# Patient Record
Sex: FEMALE | URBAN - NONMETROPOLITAN AREA
[De-identification: names, ages, dates, MRNs, and addresses within clinical notes are randomized per-mention and may not be internally consistent; named-entity substitution may affect disease eponyms.]

---

## 2021-06-28 RX ORDER — CELECOXIB 200 MG/1
200 CAPSULE ORAL 2 TIMES DAILY
COMMUNITY

## 2021-06-28 RX ORDER — CYCLOBENZAPRINE HCL 5 MG
5 TABLET ORAL 3 TIMES DAILY PRN
COMMUNITY

## 2021-06-28 RX ORDER — METHYLPREDNISOLONE 4 MG/1
2 TABLET ORAL DAILY
COMMUNITY

## 2021-07-13 ENCOUNTER — TELEPHONE (OUTPATIENT)
Dept: VASCULAR SURGERY | Age: 66
End: 2021-07-13

## 2021-07-13 NOTE — TELEPHONE ENCOUNTER
Contacted patient to reschedule appointment on 7/27/21. Patient stated that she will not be able to keep any appointments at this time due to losing her job and no transportation to and from [place].

## 2024-10-31 ENCOUNTER — TELEPHONE (OUTPATIENT)
Dept: NEUROSURGERY | Age: 69
End: 2024-10-31

## 2024-10-31 NOTE — TELEPHONE ENCOUNTER
1st attempt to contact patient. I left a voicemail instructing patient to call back at [phone] to schedule their new patient appointment     Spinal stenosis, lumbosacral region  M54.42 (ICD-10-CM) - Lumbago with sciatica, left side  M54.41 (ICD-10-CM) - Lumbago with sciatica, right side  M48.00 (ICD-10-CM) - Spinal stenosis, site unspecified

## 2024-12-17 ENCOUNTER — TELEPHONE (OUTPATIENT)
Dept: NEUROSURGERY | Age: 69
End: 2024-12-17

## 2024-12-17 NOTE — TELEPHONE ENCOUNTER
Maribel with referral provider office called she ask for a called back to scheduled patient new patient appointment. Please called Maribel [phone]      Thank you

## 2024-12-17 NOTE — TELEPHONE ENCOUNTER
Returned call to Maribel and let her know I did speak with the patient this morning and let her know the conversation we had about her reporting to the ER. Maribel stated she has had many talks with her along the same lines and would reiterate the importance of answering the phone and her next care plan steps when she reaches out to the patient again. I let Maribel know we could get her scheduled but it wouldn't be until the end of January. Maribel stated she could go ahead and get her scheduled and would just take 1st available with either provider. Dr. Stanley Oconnor had 1st available on 1/22/2025 @ 8:30am. She agreed to book patient for that and I changed referral to [facility]. I let Maribel know patient would need to bring CD with MRI on it or she can't be seen and will need to show up atleast 15 minutes early so we have time to upload it. Maribel thanked me and voiced understanding.

## 2025-01-22 ENCOUNTER — CLINICAL DOCUMENTATION (OUTPATIENT)
Dept: NEUROSURGERY | Age: 70
End: 2025-01-22

## 2025-01-22 ENCOUNTER — OFFICE VISIT (OUTPATIENT)
Dept: NEUROSURGERY | Age: 70
End: 2025-01-22
Payer: MEDICARE

## 2025-01-22 ENCOUNTER — TELEPHONE (OUTPATIENT)
Dept: NEUROSURGERY | Age: 70
End: 2025-01-22

## 2025-01-22 VITALS
DIASTOLIC BLOOD PRESSURE: 92 MMHG | BODY MASS INDEX: 20.14 KG/M2 | HEIGHT: 64 IN | WEIGHT: 118 LBS | SYSTOLIC BLOOD PRESSURE: 150 MMHG | HEART RATE: 80 BPM

## 2025-01-22 DIAGNOSIS — M48.062 SPINAL STENOSIS OF LUMBAR REGION WITH NEUROGENIC CLAUDICATION: ICD-10-CM

## 2025-01-22 DIAGNOSIS — M54.16 LUMBAR RADICULOPATHY: ICD-10-CM

## 2025-01-22 DIAGNOSIS — M43.16 SPONDYLOLISTHESIS, LUMBAR REGION: Primary | ICD-10-CM

## 2025-01-22 PROCEDURE — 1159F MED LIST DOCD IN RCRD: CPT | Performed by: NEUROLOGICAL SURGERY

## 2025-01-22 PROCEDURE — 99204 OFFICE O/P NEW MOD 45 MIN: CPT | Performed by: NEUROLOGICAL SURGERY

## 2025-01-22 PROCEDURE — 1123F ACP DISCUSS/DSCN MKR DOCD: CPT | Performed by: NEUROLOGICAL SURGERY

## 2025-01-22 RX ORDER — PREGABALIN 75 MG/1
75 CAPSULE ORAL 2 TIMES DAILY
Qty: 60 CAPSULE | Refills: 0 | Status: SHIPPED | OUTPATIENT
Start: 2025-01-22 | End: 2025-02-21

## 2025-01-22 RX ORDER — KETOROLAC TROMETHAMINE 10 MG/1
10 TABLET, FILM COATED ORAL EVERY 6 HOURS PRN
COMMUNITY

## 2025-01-22 ASSESSMENT — ENCOUNTER SYMPTOMS
RESPIRATORY NEGATIVE: 1
GASTROINTESTINAL NEGATIVE: 1
EYES NEGATIVE: 1
BACK PAIN: 1
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
## 2025-01-22 NOTE — PROGRESS NOTES
Request for MRI Lumbar Spine submitted to Insurance  Status: Pending Review  Case# [id number]  Clinical  notes uploaded to [employer]

## 2025-01-23 ENCOUNTER — TELEPHONE (OUTPATIENT)
Dept: NEUROSURGERY | Age: 70
End: 2025-01-23

## 2025-01-23 DIAGNOSIS — Z13.9 ENCOUNTER FOR SCREENING INVOLVING SOCIAL DETERMINANTS OF HEALTH (SDOH): Primary | ICD-10-CM
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
